# Patient Record
Sex: FEMALE | Race: WHITE | Employment: OTHER | URBAN - METROPOLITAN AREA
[De-identification: names, ages, dates, MRNs, and addresses within clinical notes are randomized per-mention and may not be internally consistent; named-entity substitution may affect disease eponyms.]

---

## 2019-12-16 ENCOUNTER — OFFICE VISIT (OUTPATIENT)
Dept: INTERNAL MEDICINE CLINIC | Facility: CLINIC | Age: 64
End: 2019-12-16
Payer: COMMERCIAL

## 2019-12-16 VITALS
DIASTOLIC BLOOD PRESSURE: 90 MMHG | SYSTOLIC BLOOD PRESSURE: 158 MMHG | HEIGHT: 64 IN | BODY MASS INDEX: 33.91 KG/M2 | HEART RATE: 101 BPM | OXYGEN SATURATION: 98 % | WEIGHT: 198.63 LBS

## 2019-12-16 DIAGNOSIS — Z85.42 HISTORY OF UTERINE CANCER: ICD-10-CM

## 2019-12-16 DIAGNOSIS — Z00.00 WELLNESS EXAMINATION: Primary | ICD-10-CM

## 2019-12-16 DIAGNOSIS — E03.9 ACQUIRED HYPOTHYROIDISM: ICD-10-CM

## 2019-12-16 DIAGNOSIS — I10 ESSENTIAL HYPERTENSION: ICD-10-CM

## 2019-12-16 PROBLEM — C54.9 MALIGNANT NEOPLASM OF BODY OF UTERUS (HCC): Status: ACTIVE | Noted: 2019-12-16

## 2019-12-16 PROCEDURE — 99386 PREV VISIT NEW AGE 40-64: CPT | Performed by: INTERNAL MEDICINE

## 2019-12-16 RX ORDER — LOSARTAN POTASSIUM 50 MG/1
TABLET ORAL
Refills: 0 | COMMUNITY
Start: 2019-10-21 | End: 2019-12-16

## 2019-12-16 RX ORDER — HYDROCHLOROTHIAZIDE 12.5 MG/1
CAPSULE, GELATIN COATED ORAL
Refills: 0 | COMMUNITY
Start: 2019-10-21 | End: 2019-12-16

## 2019-12-16 RX ORDER — LOSARTAN POTASSIUM 100 MG/1
100 TABLET ORAL DAILY
Qty: 90 TABLET | Refills: 3 | Status: SHIPPED | OUTPATIENT
Start: 2019-12-16 | End: 2020-12-26

## 2019-12-16 RX ORDER — LEVOTHYROXINE SODIUM 0.05 MG/1
TABLET ORAL
Refills: 0 | COMMUNITY
Start: 2019-08-06 | End: 2019-12-16

## 2019-12-16 RX ORDER — HYDROCHLOROTHIAZIDE 12.5 MG/1
CAPSULE, GELATIN COATED ORAL
Qty: 90 CAPSULE | Refills: 3 | Status: SHIPPED | OUTPATIENT
Start: 2019-12-16 | End: 2020-12-26

## 2019-12-16 RX ORDER — LEVOTHYROXINE SODIUM 0.05 MG/1
50 TABLET ORAL
Qty: 90 TABLET | Refills: 3 | Status: SHIPPED | OUTPATIENT
Start: 2019-12-16 | End: 2020-12-26

## 2019-12-16 NOTE — PROGRESS NOTES
HPI:    Patient ID: Sea Moore is a 60year old female. HPI Pt here for getting established as a new pt.       Has hx of uterine cancer 4 years ago - totalhysterectomy  In [place]  Hypothyroidism - 2 years ago  Htn - on Losartan   Travelors lilibeth wyattt to person, place, and time. Skin: No rash noted.    Psychiatric: Her behavior is normal. Thought content normal.              ASSESSMENT/PLAN:   Wellness examination  (primary encounter diagnosis) check fasting labs  Essential hypertension Losartan to 100

## 2020-12-15 ENCOUNTER — TELEPHONE (OUTPATIENT)
Dept: INTERNAL MEDICINE CLINIC | Facility: CLINIC | Age: 65
End: 2020-12-15

## 2020-12-15 RX ORDER — LOSARTAN POTASSIUM 100 MG/1
100 TABLET ORAL DAILY
Qty: 90 TABLET | Refills: 3 | OUTPATIENT
Start: 2020-12-15

## 2021-09-13 ENCOUNTER — OFFICE VISIT (OUTPATIENT)
Dept: INTERNAL MEDICINE CLINIC | Facility: CLINIC | Age: 66
End: 2021-09-13
Payer: COMMERCIAL

## 2021-09-13 VITALS
BODY MASS INDEX: 34.86 KG/M2 | HEIGHT: 64 IN | WEIGHT: 204.19 LBS | DIASTOLIC BLOOD PRESSURE: 90 MMHG | SYSTOLIC BLOOD PRESSURE: 142 MMHG

## 2021-09-13 DIAGNOSIS — F41.1 GAD (GENERALIZED ANXIETY DISORDER): ICD-10-CM

## 2021-09-13 DIAGNOSIS — Z85.42 HISTORY OF UTERINE CANCER: ICD-10-CM

## 2021-09-13 DIAGNOSIS — E03.9 ACQUIRED HYPOTHYROIDISM: Primary | ICD-10-CM

## 2021-09-13 DIAGNOSIS — I10 ESSENTIAL HYPERTENSION: ICD-10-CM

## 2021-09-13 PROCEDURE — 3077F SYST BP >= 140 MM HG: CPT | Performed by: INTERNAL MEDICINE

## 2021-09-13 PROCEDURE — 3008F BODY MASS INDEX DOCD: CPT | Performed by: INTERNAL MEDICINE

## 2021-09-13 PROCEDURE — 3080F DIAST BP >= 90 MM HG: CPT | Performed by: INTERNAL MEDICINE

## 2021-09-13 PROCEDURE — 99214 OFFICE O/P EST MOD 30 MIN: CPT | Performed by: INTERNAL MEDICINE

## 2021-09-13 NOTE — PROGRESS NOTES
Subjective:   Patient ID: Idania Mcqueen is a 61year old female. HPIPt here for a fu on hypothyroidism  And hypertension. Had labs done and tsh was elevated up to 6.3. Pt felt ill.  Increased levothyroxine to 100 mcg    History/Other:   Review of Syste Prescriptions      No prescriptions requested or ordered in this encounter       Imaging & Referrals:  None

## 2021-09-14 DIAGNOSIS — E55.9 VITAMIN D DEFICIENCY: ICD-10-CM

## 2021-09-14 DIAGNOSIS — E78.00 PURE HYPERCHOLESTEROLEMIA: Primary | ICD-10-CM

## 2021-09-14 LAB
T3, TOTAL: 102 NG/DL (ref 76–181)
T4 (THYROXINE), TOTAL: 8.6 MCG/DL (ref 5.1–11.9)
TSH W/REFLEX TO FT4: 2.6 MIU/L (ref 0.4–4.5)

## 2021-09-14 RX ORDER — LEVOTHYROXINE SODIUM 0.1 MG/1
100 TABLET ORAL DAILY
Qty: 90 TABLET | Refills: 3 | Status: SHIPPED | OUTPATIENT
Start: 2021-09-14 | End: 2021-12-03

## 2021-10-28 ENCOUNTER — HOSPITAL ENCOUNTER (OUTPATIENT)
Dept: GENERAL RADIOLOGY | Age: 66
Discharge: HOME | End: 2021-10-28
Attending: INTERNAL MEDICINE
Payer: COMMERCIAL

## 2021-10-28 ENCOUNTER — OFFICE VISIT (OUTPATIENT)
Dept: INTERNAL MEDICINE CLINIC | Facility: CLINIC | Age: 66
End: 2021-10-28
Payer: COMMERCIAL

## 2021-10-28 VITALS
BODY MASS INDEX: 35.17 KG/M2 | WEIGHT: 206 LBS | HEIGHT: 64 IN | SYSTOLIC BLOOD PRESSURE: 160 MMHG | OXYGEN SATURATION: 98 % | HEART RATE: 96 BPM | DIASTOLIC BLOOD PRESSURE: 90 MMHG

## 2021-10-28 DIAGNOSIS — M47.812 CERVICAL SPONDYLOSIS: ICD-10-CM

## 2021-10-28 DIAGNOSIS — F41.1 GAD (GENERALIZED ANXIETY DISORDER): ICD-10-CM

## 2021-10-28 DIAGNOSIS — R51.9 PERSISTENT HEADACHES: ICD-10-CM

## 2021-10-28 DIAGNOSIS — M47.812 CERVICAL SPONDYLOSIS: Primary | ICD-10-CM

## 2021-10-28 PROCEDURE — 99214 OFFICE O/P EST MOD 30 MIN: CPT | Performed by: INTERNAL MEDICINE

## 2021-10-28 PROCEDURE — 3008F BODY MASS INDEX DOCD: CPT | Performed by: INTERNAL MEDICINE

## 2021-10-28 PROCEDURE — 3077F SYST BP >= 140 MM HG: CPT | Performed by: INTERNAL MEDICINE

## 2021-10-28 PROCEDURE — 72050 X-RAY EXAM NECK SPINE 4/5VWS: CPT | Performed by: INTERNAL MEDICINE

## 2021-10-28 PROCEDURE — 3080F DIAST BP >= 90 MM HG: CPT | Performed by: INTERNAL MEDICINE

## 2021-10-28 RX ORDER — ESCITALOPRAM OXALATE 5 MG/1
5 TABLET ORAL DAILY
Qty: 30 TABLET | Refills: 5 | Status: SHIPPED | OUTPATIENT
Start: 2021-10-28

## 2021-10-28 NOTE — PROGRESS NOTES
Subjective:   Patient ID: Shadi Parker is a 61year old female. Neck Pain   Associated symptoms include numbness.      Patient here for evaluation of some on and off pressure and zapping feeling of the back of the neck with a feeling like there is a hat Lexapro 5 mg daily    No orders of the defined types were placed in this encounter.       Meds This Visit:  Requested Prescriptions      No prescriptions requested or ordered in this encounter       Imaging & Referrals:  None

## 2021-11-10 ENCOUNTER — HOSPITAL ENCOUNTER (OUTPATIENT)
Dept: CT IMAGING | Age: 66
Discharge: HOME | End: 2021-11-10
Attending: INTERNAL MEDICINE
Payer: COMMERCIAL

## 2021-11-10 DIAGNOSIS — R51.9 PERSISTENT HEADACHES: ICD-10-CM

## 2021-11-10 PROCEDURE — 70450 CT HEAD/BRAIN W/O DYE: CPT | Performed by: INTERNAL MEDICINE

## 2021-12-03 RX ORDER — LEVOTHYROXINE SODIUM 0.1 MG/1
TABLET ORAL
Qty: 90 TABLET | Refills: 3 | Status: SHIPPED | OUTPATIENT
Start: 2021-12-03

## 2021-12-16 RX ORDER — LOSARTAN POTASSIUM 100 MG/1
100 TABLET ORAL DAILY
Qty: 90 TABLET | Refills: 3 | Status: SHIPPED | OUTPATIENT
Start: 2021-12-16

## 2022-02-16 RX ORDER — LOSARTAN POTASSIUM 100 MG/1
TABLET ORAL
Qty: 90 TABLET | Refills: 3 | Status: SHIPPED | OUTPATIENT
Start: 2022-02-16

## 2022-05-23 RX ORDER — ESCITALOPRAM OXALATE 5 MG/1
TABLET ORAL
Qty: 30 TABLET | Refills: 5 | Status: SHIPPED | OUTPATIENT
Start: 2022-05-23

## 2022-12-19 RX ORDER — ESCITALOPRAM OXALATE 5 MG/1
TABLET ORAL
Qty: 30 TABLET | Refills: 0 | OUTPATIENT
Start: 2022-12-19

## 2023-06-19 RX ORDER — LEVOTHYROXINE SODIUM 0.1 MG/1
TABLET ORAL
Qty: 300 TABLET | Refills: 0 | OUTPATIENT
Start: 2023-06-19

## 2023-09-28 ENCOUNTER — OFFICE VISIT (OUTPATIENT)
Dept: FAMILY MEDICINE CLINIC | Facility: CLINIC | Age: 68
End: 2023-09-28
Payer: MEDICARE

## 2023-09-28 VITALS
HEART RATE: 80 BPM | TEMPERATURE: 98 F | HEIGHT: 63.5 IN | WEIGHT: 208.81 LBS | SYSTOLIC BLOOD PRESSURE: 126 MMHG | OXYGEN SATURATION: 96 % | BODY MASS INDEX: 36.54 KG/M2 | DIASTOLIC BLOOD PRESSURE: 86 MMHG

## 2023-09-28 DIAGNOSIS — R05.9 COUGH IN ADULT: Primary | ICD-10-CM

## 2023-09-28 DIAGNOSIS — R09.82 POST-NASAL DRIP: ICD-10-CM

## 2023-09-28 LAB
OPERATOR ID: NORMAL
RAPID SARS-COV-2 BY PCR: NOT DETECTED

## 2023-09-28 PROCEDURE — 99213 OFFICE O/P EST LOW 20 MIN: CPT | Performed by: NURSE PRACTITIONER

## 2023-09-28 PROCEDURE — U0002 COVID-19 LAB TEST NON-CDC: HCPCS | Performed by: NURSE PRACTITIONER

## 2023-09-28 RX ORDER — FLUTICASONE PROPIONATE 50 MCG
2 SPRAY, SUSPENSION (ML) NASAL NIGHTLY
Qty: 1 EACH | Refills: 0 | Status: SHIPPED | OUTPATIENT
Start: 2023-09-28

## 2023-09-28 RX ORDER — BENZONATATE 200 MG/1
200 CAPSULE ORAL 3 TIMES DAILY PRN
Qty: 30 CAPSULE | Refills: 0 | Status: SHIPPED | OUTPATIENT
Start: 2023-09-28

## 2023-09-28 NOTE — PATIENT INSTRUCTIONS
General comfort measures:  Get rest!  Hydrate! (cold or hot based on comfort). Drink lots of water or other non dehydrating liquids to help with illness. Salty foods, soups and tea can help with throat pain. Hand washing-use hand  or wash hands frequently, cover your cough or sneeze, do not share towels or drinks with others. Salt water gargles (1 tsp. Salt in 6 oz lukewarm water): gargle for 2 minutes, repeat every 15 minutes as needed to help decrease swelling and relieve pain. Use humidified air, steamy showers/baths and use vaporizer in sleeping quarters to keep secretions thin. Avoid smoking. Follow up with your PCP in 1-2 weeks if not better. Follow up in a few days if worsening symptoms. Seek immediate care if inability to swallow or breathe.

## 2023-09-29 ENCOUNTER — TELEPHONE (OUTPATIENT)
Dept: FAMILY MEDICINE CLINIC | Facility: CLINIC | Age: 68
End: 2023-09-29

## 2023-09-29 NOTE — TELEPHONE ENCOUNTER
[id number] visit follow up. Patient/parent reports cough did not improve with pearls last night and would like some advice on what else she can take. States still having coughing fits. No fever or sob. Discussed trial of short term steroid boost.  Patient states she has some prednisone at home. Advised she can take 40mg once a day for 5 days. Side affects reviewed. To continue tessalon and to monitor for worsening symptoms. No further questions or concerns at this time. To call [phone] for any questions or concerns. To follow up if symptoms fail to improve. Verbalized understanding.

## 2023-11-08 RX ORDER — ESCITALOPRAM OXALATE 5 MG/1
5 TABLET ORAL DAILY
Qty: 90 TABLET | Refills: 0 | OUTPATIENT
Start: 2023-11-08

## 2023-12-07 ENCOUNTER — OFFICE VISIT (OUTPATIENT)
Dept: INTERNAL MEDICINE CLINIC | Facility: CLINIC | Age: 68
End: 2023-12-07
Payer: MEDICARE

## 2023-12-07 VITALS
HEART RATE: 85 BPM | DIASTOLIC BLOOD PRESSURE: 78 MMHG | SYSTOLIC BLOOD PRESSURE: 126 MMHG | HEIGHT: 63.5 IN | OXYGEN SATURATION: 97 % | WEIGHT: 208 LBS | BODY MASS INDEX: 36.4 KG/M2

## 2023-12-07 DIAGNOSIS — E03.8 OTHER SPECIFIED HYPOTHYROIDISM: ICD-10-CM

## 2023-12-07 DIAGNOSIS — Z00.00 MEDICARE ANNUAL WELLNESS VISIT, SUBSEQUENT: Primary | ICD-10-CM

## 2023-12-07 DIAGNOSIS — F41.1 GAD (GENERALIZED ANXIETY DISORDER): ICD-10-CM

## 2023-12-07 LAB
ALBUMIN SERPL-MCNC: 4.3 G/DL (ref 3.2–4.8)
ALBUMIN/GLOB SERPL: 1.6 {RATIO} (ref 1–2)
ALP LIVER SERPL-CCNC: 95 U/L
ALT SERPL-CCNC: 25 U/L
ANION GAP SERPL CALC-SCNC: 6 MMOL/L (ref 0–18)
AST SERPL-CCNC: 26 U/L (ref ?–34)
BASOPHILS # BLD AUTO: 0.08 X10(3) UL (ref 0–0.2)
BASOPHILS NFR BLD AUTO: 1.5 %
BILIRUB SERPL-MCNC: 0.6 MG/DL (ref 0.2–1.1)
BUN BLD-MCNC: 14 MG/DL (ref 9–23)
BUN/CREAT SERPL: 20.9 (ref 10–20)
CALCIUM BLD-MCNC: 9.5 MG/DL (ref 8.7–10.4)
CHLORIDE SERPL-SCNC: 106 MMOL/L (ref 98–112)
CHOLEST SERPL-MCNC: 236 MG/DL (ref ?–200)
CO2 SERPL-SCNC: 26 MMOL/L (ref 21–32)
CREAT BLD-MCNC: 0.67 MG/DL
DEPRECATED RDW RBC AUTO: 42.9 FL (ref 35.1–46.3)
EGFRCR SERPLBLD CKD-EPI 2021: 96 ML/MIN/1.73M2 (ref 60–?)
EOSINOPHIL # BLD AUTO: 0.21 X10(3) UL (ref 0–0.7)
EOSINOPHIL NFR BLD AUTO: 4 %
ERYTHROCYTE [DISTWIDTH] IN BLOOD BY AUTOMATED COUNT: 12.7 % (ref 11–15)
FASTING PATIENT LIPID ANSWER: YES
FASTING STATUS PATIENT QL REPORTED: YES
GLOBULIN PLAS-MCNC: 2.7 G/DL (ref 2.8–4.4)
GLUCOSE BLD-MCNC: 96 MG/DL (ref 70–99)
HCT VFR BLD AUTO: 41.9 %
HDLC SERPL-MCNC: 64 MG/DL (ref 40–59)
HGB BLD-MCNC: 13.6 G/DL
IMM GRANULOCYTES # BLD AUTO: 0.01 X10(3) UL (ref 0–1)
IMM GRANULOCYTES NFR BLD: 0.2 %
LDLC SERPL CALC-MCNC: 147 MG/DL (ref ?–100)
LYMPHOCYTES # BLD AUTO: 2.04 X10(3) UL (ref 1–4)
LYMPHOCYTES NFR BLD AUTO: 38.5 %
MCH RBC QN AUTO: 29.9 PG (ref 26–34)
MCHC RBC AUTO-ENTMCNC: 32.5 G/DL (ref 31–37)
MCV RBC AUTO: 92.1 FL
MONOCYTES # BLD AUTO: 0.55 X10(3) UL (ref 0.1–1)
MONOCYTES NFR BLD AUTO: 10.4 %
NEUTROPHILS # BLD AUTO: 2.41 X10 (3) UL (ref 1.5–7.7)
NEUTROPHILS # BLD AUTO: 2.41 X10(3) UL (ref 1.5–7.7)
NEUTROPHILS NFR BLD AUTO: 45.4 %
NONHDLC SERPL-MCNC: 172 MG/DL (ref ?–130)
OSMOLALITY SERPL CALC.SUM OF ELEC: 286 MOSM/KG (ref 275–295)
PLATELET # BLD AUTO: 346 10(3)UL (ref 150–450)
POTASSIUM SERPL-SCNC: 4 MMOL/L (ref 3.5–5.1)
PROT SERPL-MCNC: 7 G/DL (ref 5.7–8.2)
RBC # BLD AUTO: 4.55 X10(6)UL
SODIUM SERPL-SCNC: 138 MMOL/L (ref 136–145)
T4 FREE SERPL-MCNC: 1.3 NG/DL (ref 0.8–1.7)
TRIGL SERPL-MCNC: 143 MG/DL (ref 30–149)
TSI SER-ACNC: 1.25 MIU/ML (ref 0.55–4.78)
VLDLC SERPL CALC-MCNC: 27 MG/DL (ref 0–30)
WBC # BLD AUTO: 5.3 X10(3) UL (ref 4–11)

## 2023-12-07 PROCEDURE — 84439 ASSAY OF FREE THYROXINE: CPT | Performed by: INTERNAL MEDICINE

## 2023-12-07 PROCEDURE — 84443 ASSAY THYROID STIM HORMONE: CPT | Performed by: INTERNAL MEDICINE

## 2023-12-07 PROCEDURE — 85025 COMPLETE CBC W/AUTO DIFF WBC: CPT | Performed by: INTERNAL MEDICINE

## 2023-12-07 PROCEDURE — 80053 COMPREHEN METABOLIC PANEL: CPT | Performed by: INTERNAL MEDICINE

## 2023-12-07 PROCEDURE — 80061 LIPID PANEL: CPT | Performed by: INTERNAL MEDICINE

## 2023-12-07 RX ORDER — LOSARTAN POTASSIUM 100 MG/1
100 TABLET ORAL DAILY
Qty: 90 TABLET | Refills: 3 | Status: SHIPPED | OUTPATIENT
Start: 2023-12-07

## 2023-12-07 RX ORDER — ESCITALOPRAM OXALATE 5 MG/1
5 TABLET ORAL DAILY
Qty: 90 TABLET | Refills: 3 | Status: SHIPPED | OUTPATIENT
Start: 2023-12-07

## 2023-12-07 RX ORDER — LEVOTHYROXINE SODIUM 0.1 MG/1
100 TABLET ORAL DAILY
Qty: 90 TABLET | Refills: 3 | Status: SHIPPED | OUTPATIENT
Start: 2023-12-07

## 2024-12-24 RX ORDER — ESCITALOPRAM OXALATE 5 MG/1
5 TABLET ORAL DAILY
Qty: 90 TABLET | Refills: 0 | Status: SHIPPED | OUTPATIENT
Start: 2024-12-24 | End: 2024-12-26

## 2024-12-26 ENCOUNTER — MED REC SCAN ONLY (OUTPATIENT)
Dept: INTERNAL MEDICINE CLINIC | Facility: CLINIC | Age: 69
End: 2024-12-26

## 2024-12-26 ENCOUNTER — OFFICE VISIT (OUTPATIENT)
Dept: INTERNAL MEDICINE CLINIC | Facility: CLINIC | Age: 69
End: 2024-12-26
Payer: MEDICARE

## 2024-12-26 ENCOUNTER — LAB ENCOUNTER (OUTPATIENT)
Dept: LAB | Age: 69
End: 2024-12-26
Attending: INTERNAL MEDICINE
Payer: MEDICARE

## 2024-12-26 VITALS
BODY MASS INDEX: 35.87 KG/M2 | SYSTOLIC BLOOD PRESSURE: 120 MMHG | DIASTOLIC BLOOD PRESSURE: 80 MMHG | HEIGHT: 63.5 IN | WEIGHT: 205 LBS | HEART RATE: 92 BPM | OXYGEN SATURATION: 98 %

## 2024-12-26 DIAGNOSIS — Z00.00 MEDICARE ANNUAL WELLNESS VISIT, SUBSEQUENT: ICD-10-CM

## 2024-12-26 DIAGNOSIS — E55.9 VITAMIN D DEFICIENCY: ICD-10-CM

## 2024-12-26 DIAGNOSIS — E03.8 OTHER SPECIFIED HYPOTHYROIDISM: ICD-10-CM

## 2024-12-26 DIAGNOSIS — Z00.00 MEDICARE ANNUAL WELLNESS VISIT, SUBSEQUENT: Primary | ICD-10-CM

## 2024-12-26 DIAGNOSIS — F41.1 GAD (GENERALIZED ANXIETY DISORDER): ICD-10-CM

## 2024-12-26 LAB
ALBUMIN SERPL-MCNC: 4.4 G/DL (ref 3.2–4.8)
ALBUMIN/GLOB SERPL: 1.7 {RATIO} (ref 1–2)
ALP LIVER SERPL-CCNC: 89 U/L
ALT SERPL-CCNC: 27 U/L
ANION GAP SERPL CALC-SCNC: 7 MMOL/L (ref 0–18)
AST SERPL-CCNC: 26 U/L (ref ?–34)
BASOPHILS # BLD AUTO: 0.07 X10(3) UL (ref 0–0.2)
BASOPHILS NFR BLD AUTO: 1 %
BILIRUB SERPL-MCNC: 0.5 MG/DL (ref 0.2–1.1)
BUN BLD-MCNC: 14 MG/DL (ref 9–23)
BUN/CREAT SERPL: 18.7 (ref 10–20)
CALCIUM BLD-MCNC: 9.6 MG/DL (ref 8.7–10.4)
CHLORIDE SERPL-SCNC: 108 MMOL/L (ref 98–112)
CHOLEST SERPL-MCNC: 210 MG/DL (ref ?–200)
CO2 SERPL-SCNC: 27 MMOL/L (ref 21–32)
CREAT BLD-MCNC: 0.75 MG/DL
DEPRECATED RDW RBC AUTO: 41.6 FL (ref 35.1–46.3)
EGFRCR SERPLBLD CKD-EPI 2021: 87 ML/MIN/1.73M2 (ref 60–?)
EOSINOPHIL # BLD AUTO: 0.17 X10(3) UL (ref 0–0.7)
EOSINOPHIL NFR BLD AUTO: 2.5 %
ERYTHROCYTE [DISTWIDTH] IN BLOOD BY AUTOMATED COUNT: 12.3 % (ref 11–15)
FASTING PATIENT LIPID ANSWER: NO
FASTING STATUS PATIENT QL REPORTED: NO
GLOBULIN PLAS-MCNC: 2.6 G/DL (ref 2–3.5)
GLUCOSE BLD-MCNC: 91 MG/DL (ref 70–99)
HCT VFR BLD AUTO: 40.5 %
HDLC SERPL-MCNC: 76 MG/DL (ref 40–59)
HGB BLD-MCNC: 13.5 G/DL
IMM GRANULOCYTES # BLD AUTO: 0.01 X10(3) UL (ref 0–1)
IMM GRANULOCYTES NFR BLD: 0.1 %
LDLC SERPL CALC-MCNC: 116 MG/DL (ref ?–100)
LYMPHOCYTES # BLD AUTO: 1.85 X10(3) UL (ref 1–4)
LYMPHOCYTES NFR BLD AUTO: 27 %
MCH RBC QN AUTO: 30.6 PG (ref 26–34)
MCHC RBC AUTO-ENTMCNC: 33.3 G/DL (ref 31–37)
MCV RBC AUTO: 91.8 FL
MONOCYTES # BLD AUTO: 0.58 X10(3) UL (ref 0.1–1)
MONOCYTES NFR BLD AUTO: 8.5 %
NEUTROPHILS # BLD AUTO: 4.18 X10 (3) UL (ref 1.5–7.7)
NEUTROPHILS # BLD AUTO: 4.18 X10(3) UL (ref 1.5–7.7)
NEUTROPHILS NFR BLD AUTO: 60.9 %
NONHDLC SERPL-MCNC: 134 MG/DL (ref ?–130)
OSMOLALITY SERPL CALC.SUM OF ELEC: 294 MOSM/KG (ref 275–295)
PLATELET # BLD AUTO: 349 10(3)UL (ref 150–450)
POTASSIUM SERPL-SCNC: 3.8 MMOL/L (ref 3.5–5.1)
PROT SERPL-MCNC: 7 G/DL (ref 5.7–8.2)
RBC # BLD AUTO: 4.41 X10(6)UL
SODIUM SERPL-SCNC: 142 MMOL/L (ref 136–145)
T4 FREE SERPL-MCNC: 1.3 NG/DL (ref 0.8–1.7)
TRIGL SERPL-MCNC: 103 MG/DL (ref 30–149)
TSI SER-ACNC: 3.7 UIU/ML (ref 0.55–4.78)
VIT D+METAB SERPL-MCNC: 46 NG/ML (ref 30–100)
VLDLC SERPL CALC-MCNC: 18 MG/DL (ref 0–30)
WBC # BLD AUTO: 6.9 X10(3) UL (ref 4–11)

## 2024-12-26 PROCEDURE — 80053 COMPREHEN METABOLIC PANEL: CPT

## 2024-12-26 PROCEDURE — 85025 COMPLETE CBC W/AUTO DIFF WBC: CPT

## 2024-12-26 PROCEDURE — 82306 VITAMIN D 25 HYDROXY: CPT

## 2024-12-26 PROCEDURE — 84443 ASSAY THYROID STIM HORMONE: CPT

## 2024-12-26 PROCEDURE — 80061 LIPID PANEL: CPT

## 2024-12-26 PROCEDURE — G0439 PPPS, SUBSEQ VISIT: HCPCS | Performed by: INTERNAL MEDICINE

## 2024-12-26 PROCEDURE — 36415 COLL VENOUS BLD VENIPUNCTURE: CPT

## 2024-12-26 PROCEDURE — 84439 ASSAY OF FREE THYROXINE: CPT

## 2024-12-26 RX ORDER — LOSARTAN POTASSIUM 100 MG/1
100 TABLET ORAL DAILY
Qty: 90 TABLET | Refills: 3 | Status: SHIPPED | OUTPATIENT
Start: 2024-12-26

## 2024-12-26 RX ORDER — LEVOTHYROXINE SODIUM 100 UG/1
100 TABLET ORAL DAILY
Qty: 90 TABLET | Refills: 3 | Status: SHIPPED | OUTPATIENT
Start: 2024-12-26

## 2024-12-26 RX ORDER — ESCITALOPRAM OXALATE 5 MG/1
5 TABLET ORAL DAILY
Qty: 90 TABLET | Refills: 3 | Status: SHIPPED | OUTPATIENT
Start: 2024-12-26

## 2024-12-26 NOTE — PROGRESS NOTES
Subjective:   Danae Deng is a 69 year old female who presents for Physical     Patient here for a AWV and fu on hypothyroidism, htn, and DILAN.  Had a screening mammogram in [place] this year and colonoscopy 5 years , both normal per patient. Anxiety is controlled with Lexapro  HPI:   Danae Deng is a 69 year old female who presents for a Medicare Annual Wellness visit.    Patient Active Problem List   Diagnosis    Malignant neoplasm of body of uterus (HCC)       General Health     In the past six months, have you lost more than 10 pounds without trying?: 2 - No    Has your appetite been poor?: No    Type of Diet: Vegetarian    How does the patient maintain a good energy level?: Other    How would you describe your daily physical activity?: Light    How would you describe your current health state?: Good    How do you maintain positive mental well-being?: Visiting Family;Social Interaction;Games;Visiting Friends         Have you had any immunizations at another office such as Influenza, Hepatitis B, Tetanus, or Pneumococcal?: No     Functional Ability     Bathing or Showering: Able without help    Toileting: Able without help    Dressing: Able without help    Eating: Able without help    Driving: Able without help    Preparing your meals: Able without help    Managing money/bills: Able without help    Taking medications as prescribed: Able without help    Are you able to afford your medications?: Yes    Hearing Problems?: No     Functional Status     Hearing Problems?: No    Vision Problems? : No    Difficulty walking?: No    Difficulty dressing or bathing?: No    Problems with daily activities? : No    Memory Problems?: No      Fall/Risk Assessment                                                              Depression Screening (PHQ-2/PHQ-9): Over the LAST 2 WEEKS                      Advance Directives     Do you have a healthcare power of ?: No    Do you have a living will?: No   Was Medicare  Assessment Questionnaire completed by patient and sent to HIM for scanning?Yes    Please go to \"Cognitive Assessment\" under Medicare Assessment section in Charting, test patient and document.    Then, refresh your progress note to see your input here.  Cognitive Assessment     What day of the week is this?: Correct    What month is it?: Correct    What year is it?: Correct    Recall \"Ball\": Correct    Recall \"Flag\": Correct    Recall \"Tree\": Correct      ALLERGIES:   Allergies[1]    CURRENT MEDICATIONS:   Current Outpatient Medications   Medication Sig Dispense Refill    escitalopram 5 MG Oral Tab Take 1 tablet (5 mg total) by mouth daily. 90 tablet 3    levothyroxine 100 MCG Oral Tab Take 1 tablet (100 mcg total) by mouth daily. 90 tablet 3    losartan 100 MG Oral Tab Take 1 tablet (100 mg total) by mouth daily. 90 tablet 3    benzonatate 200 MG Oral Cap Take 1 capsule (200 mg total) by mouth 3 (three) times daily as needed for cough. 30 capsule 0    fluticasone propionate 50 MCG/ACT Nasal Suspension 2 sprays by Each Nare route nightly. Point up and out. 1 each 0      MEDICAL INFORMATION:   Past Medical History:    Uterine cancer (HCC)      Past Surgical History:   Procedure Laterality Date    Hysterectomy        No family history on file.   SOCIAL HISTORY:   Social History     Socioeconomic History    Marital status:    Tobacco Use    Smoking status: Never    Smokeless tobacco: Never   Substance and Sexual Activity    Alcohol use: Not Currently     Occ: retired : no      REVIEW OF SYSTEMS:   GENERAL: feels well otherwise  SKIN: denies any unusual skin lesions  EYES: denies blurred vision or double vision myopia  HEENT: denies nasal congestion, sinus pain or ST  LUNGS: denies shortness of breath with exertion  CARDIOVASCULAR: denies chest pain on exertion  GI: denies abdominal pain, denies heartburn  : denies dysuria, vaginal discharge or itching, no complaint of urinary  incontinence   MUSCULOSKELETAL: denies back pain  NEURO: denies headaches  PSYCHE:controlled depression or anxiety  HEMATOLOGIC: denies hx of anemia  ENDOCRINE: hypo thyroid history  ALL/ASTHMA: denies hx of allergy or asthma    EXAM:   /80   Pulse 92   Ht 5' 3.5\" (1.613 m)   Wt 205 lb (93 kg)   SpO2 98%   BMI 35.74 kg/m²      >   Wt Readings from Last 6 Encounters:   12/26/24 205 lb (93 kg)   12/07/23 208 lb (94.3 kg)   09/28/23 208 lb 12.8 oz (94.7 kg)   10/28/21 206 lb (93.4 kg)   09/13/21 204 lb 3.2 oz (92.6 kg)   12/16/19 198 lb 9.6 oz (90.1 kg)       >   BP Readings from Last 3 Encounters:   12/26/24 120/80   12/07/23 126/78   09/28/23 126/86       GENERAL: well developed, well nourished, in no apparent distress  SKIN: no rashes, no suspicious lesions  HEENT: atraumatic, normocephalic, ears and throat are clear   Hearing intact  EYES: PERRLA, EOMI, conjunctiva are clear  Right Eye Visual Acuity: Uncorrected Left Eye Visual Acuity: Uncorrected   Right Eye Chart Acuity: 20/20 Left Eye Chart Acuity: 20/20   NECK: supple, no adenopathy, no bruits  CHEST: no chest tenderness  BREAST: no masses, no discharge or no axillary lymphadenopathy pendulous no palpable masses  LUNGS: clear to auscultation  CARDIO: RRR without murmur  GI: good BS's, no masses, HSM or tenderness  : deferred  RECTAL: deferred  MUSCULOSKELETAL: back is not tender, FROM of the back  EXTREMITIES: no cyanosis, clubbing or edema.  NEURO: Oriented times three, cranial nerves are intact, motor and sensory are grossly intact    ASSESSMENT AND OTHER RELEVANT CHRONIC CONDITIONS:   Danae Deng is a 69 year old female who presents for a Medicare Assessment.     PLAN SUMMARY:   Diagnoses and all orders for this visit:    Medicare annual wellness visit, subsequent  -     Comp Metabolic Panel (14) [E]; Future  -     Lipid Panel [E]; Future  -     CBC W Differential W Platelet [E]; Future    Other specified hypothyroidism  -     TSH and Free T4  [E]; Future    DILAN (generalized anxiety disorder)    Vitamin D deficiency  -     Vitamin D [E]; Future    Other orders  -     escitalopram 5 MG Oral Tab; Take 1 tablet (5 mg total) by mouth daily.  -     levothyroxine 100 MCG Oral Tab; Take 1 tablet (100 mcg total) by mouth daily.  -     losartan 100 MG Oral Tab; Take 1 tablet (100 mg total) by mouth daily.         The patient indicates understanding of these issues and agrees to the plan.  The patient is asked to return in 12m for fu.       PREVENTATIVE SERVICES  INDICATIONS AND SCHEDULE Internal Lab or Procedure External Lab or Procedure   Diabetes Screening      HbgA1C   Annually No results found for: \"A1C\"      No data to display                Fasting Blood Sugar (FSB)Annually No results found for: \"GLUCOSE\"    Cardiovascular Disease Screening     LDL Annually LDL Cholesterol (mg/dL)   Date Value   12/07/2023 147 (H)        EKG One Time     Colorectal Cancer Screening      Colonoscopy Screen every 10 years Health Maintenance   Topic Date Due    Colorectal Cancer Screening  Never done    Update Health Maintenance if applicable    Flex Sigmoidoscopy Screen every 10 years No results found for this or any previous visit.      No data to display                 Fecal Occult Blood Annually No results found for: \"FOB\"      No data to display                Glaucoma Screening      Ophthalmology Visit Annually     Bone Density Screening      Dexascan Every two years No results found for this or any previous visit.        No data to display                Pap and Pelvic      Pap  Annually if high risk No recommendations at this time Update Health Maintenance if applicable    Pap  Every two years No recommendations at this time Update Health Maintenance if applicable    Chlamydia  Annually if high risk No results found for: \"CHLAMYDIA\"      No data to display                Screening Mammogram      Mammogram  Annually Health Maintenance   Topic Date Due    Mammogram   Never done    Update Health Maintenance if applicable   Immunizations      Influenza No orders found for this or any previous visit. Update Immunization Activity if applicable    Pneumococcal No orders found for this or any previous visit. Update Immunization Activity if applicable    Hepatitis B No orders found for this or any previous visit. Update Immunization Activity if applicable    Tetanus No orders found for this or any previous visit. Update Immunization Activity if applicable        SPECIFIC DISEASE MONITORING Internal Lab or Procedure External Lab or Procedure   Annual Monitoring of Persistent     Medications (ACE/ARB, digoxin diuretics, anticonvulsants.)    Potassium  Annually Potassium (mmol/L)   Date Value   12/07/2023 4.0         No data to display                Creatinine  Annually Creatinine (mg/dL)   Date Value   12/07/2023 0.67         No data to display                BUN  Annually BUN (mg/dL)   Date Value   12/07/2023 14         No data to display                 Drug Serum Conc  Annually No results found for: \"DIGOXIN\", \"DIG\", \"VALP\"      No data to display                Diabetes      HgbA1C  Annually No results found for: \"A1C\"      No data to display                Creat/alb ratio  Annually      LDL  Annually LDL Cholesterol (mg/dL)   Date Value   12/07/2023 147 (H)         No data to display                 Dilated Eye exam  Annually      No data to display                   No data to display                COPD      Spirometry Testing Annually No results found for this or any previous visit.      No data to display                  SCREENING SCHEDULE - FEMALE      SCREEN COVERAGE SCHEDULE  (If Indicated) LAST DONE   Dexa If at Risk q2 years    Lipids All Patients q5 years LDL Cholesterol (mg/dL)   Date Value   12/07/2023 147 (H)      Colonoscopy All Patients q10 years Health Maintenance   Topic Date Due    Colorectal Cancer Screening  Never done      FBS All Patients q1 year No results  found for: \"GLUCOSE\"   Glaucoma If at high risk q1 year    Pap If at high risk q1 year No recommendations at this time   Pap All Patients q2 year if indicated No recommendations at this time   Mammogram Age > 39 q1 year Health Maintenance   Topic Date Due    Mammogram  Never done      EKG All Patients One at initial exam    Vaccines:      Pneumococcal All Patients Once per lifetime No orders found for this or any previous visit.   Influenza All Patients Annually No orders found for this or any previous visit.   Hepatitis B If at Risk 3 scheduled doses No orders found for this or any previous visit.         SUGGESTED VACCINATIONS - Influenza, Pneumococcal, Zoster, Tetanus     Immunization History   Administered Date(s) Administered    Covid-19 Vaccine Pfizer 30 mcg/0.3 ml 03/02/2021, 03/30/2021, 12/06/2021         History/Other:    Chief Complaint Reviewed and Verified  No Further Nursing Notes to   Review  Problem List Reviewed         Tobacco: non smoker      Current Outpatient Medications   Medication Sig Dispense Refill    ESCITALOPRAM 5 MG Oral Tab Take 1 tablet (5 mg total) by mouth daily. 90 tablet 0    levothyroxine 100 MCG Oral Tab Take 1 tablet (100 mcg total) by mouth daily. 90 tablet 3    losartan 100 MG Oral Tab Take 1 tablet (100 mg total) by mouth daily. 90 tablet 3    benzonatate 200 MG Oral Cap Take 1 capsule (200 mg total) by mouth 3 (three) times daily as needed for cough. 30 capsule 0    fluticasone propionate 50 MCG/ACT Nasal Suspension 2 sprays by Each Nare route nightly. Point up and out. 1 each 0         Review of Systems:  Review of Systems      Objective:   /80   Pulse 92   Ht 5' 3.5\" (1.613 m)   Wt 205 lb (93 kg)   SpO2 98%   BMI 35.74 kg/m²  Estimated body mass index is 35.74 kg/m² as calculated from the following:    Height as of this encounter: 5' 3.5\" (1.613 m).    Weight as of this encounter: 205 lb (93 kg).  Physical Exam      Assessment & Plan:   1. Medicare annual  wellness visit, subsequent (Primary)check fasting labs  2. Other specified hypothyroidism on levothyroxine 100mcg  3. DILAN (generalized anxiety disorder) continue Lexapro 5 mg        No follow-ups on file.    Judy Rubalcava MD, 12/26/2024, 3:19 PM        [1] No Known Allergies